# Patient Record
Sex: MALE | Race: OTHER | HISPANIC OR LATINO | URBAN - METROPOLITAN AREA
[De-identification: names, ages, dates, MRNs, and addresses within clinical notes are randomized per-mention and may not be internally consistent; named-entity substitution may affect disease eponyms.]

---

## 2017-02-05 ENCOUNTER — EMERGENCY (EMERGENCY)
Facility: HOSPITAL | Age: 22
LOS: 0 days | Discharge: HOME | End: 2017-02-05
Attending: EMERGENCY MEDICINE | Admitting: EMERGENCY MEDICINE
Payer: COMMERCIAL

## 2017-02-05 VITALS
TEMPERATURE: 98 F | SYSTOLIC BLOOD PRESSURE: 108 MMHG | RESPIRATION RATE: 18 BRPM | HEART RATE: 77 BPM | OXYGEN SATURATION: 99 % | DIASTOLIC BLOOD PRESSURE: 73 MMHG

## 2017-02-05 VITALS
TEMPERATURE: 98 F | DIASTOLIC BLOOD PRESSURE: 64 MMHG | HEIGHT: 66 IN | HEART RATE: 71 BPM | SYSTOLIC BLOOD PRESSURE: 115 MMHG | RESPIRATION RATE: 18 BRPM | WEIGHT: 147.05 LBS | OXYGEN SATURATION: 99 %

## 2017-02-05 DIAGNOSIS — S00.11XA CONTUSION OF RIGHT EYELID AND PERIOCULAR AREA, INITIAL ENCOUNTER: ICD-10-CM
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
## 2017-02-05 DIAGNOSIS — M54.2 CERVICALGIA: ICD-10-CM
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
## 2017-02-05 DIAGNOSIS — S09.90XA UNSPECIFIED INJURY OF HEAD, INITIAL ENCOUNTER: ICD-10-CM

## 2017-02-05 DIAGNOSIS — S00.12XA CONTUSION OF LEFT EYELID AND PERIOCULAR AREA, INITIAL ENCOUNTER: ICD-10-CM

## 2017-02-05 DIAGNOSIS — R07.89 OTHER CHEST PAIN: ICD-10-CM

## 2017-02-05 DIAGNOSIS — S00.83XA CONTUSION OF OTHER PART OF HEAD, INITIAL ENCOUNTER: ICD-10-CM

## 2017-02-05 PROCEDURE — 99284 EMERGENCY DEPT VISIT MOD MDM: CPT

## 2017-02-05 PROCEDURE — 72125 CT NECK SPINE W/O DYE: CPT | Mod: 26

## 2017-02-05 PROCEDURE — 71250 CT THORAX DX C-: CPT | Mod: 26

## 2017-02-05 PROCEDURE — 70450 CT HEAD/BRAIN W/O DYE: CPT | Mod: 26

## 2017-02-05 NOTE — ED PROVIDER NOTE - NEUROLOGICAL, MLM
Alert and oriented, no focal deficits, no motor or sensory deficits. Cranial nerves II-XII intact. 5/5 strength in upper and lower extremities flexion and extension.

## 2017-02-05 NOTE — ED PROVIDER NOTE - CARE PLAN
Principal Discharge DX:	Closed head injury, initial encounter  Secondary Diagnosis:	Contusion of scalp, initial encounter  Secondary Diagnosis:	Muscle strain

## 2017-02-05 NOTE — ED PROVIDER NOTE - MUSCULOSKELETAL, MLM
+TTP right superior orbital brim. TTP of the soft tissue overlying the right parietal cranium. TTP of bruise that pt has on left axilla.

## 2017-02-05 NOTE — ED PROVIDER NOTE - NS ED MD SCRIBE ATTENDING SCRIBE SECTIONS
Name band;
VITAL SIGNS( Pullset)/PAST MEDICAL/SURGICAL/SOCIAL HISTORY/PHYSICAL EXAM/DISPOSITION/HISTORY OF PRESENT ILLNESS/REVIEW OF SYSTEMS

## 2017-02-05 NOTE — ED PROVIDER NOTE - MEDICAL DECISION MAKING DETAILS
CTs of head, neck, face/orbits, and chest do not show any evidence of acute pathology or fx. Patient informed of the results and the imaging and is eager to leave.
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
## 2019-09-23 ENCOUNTER — EMERGENCY (EMERGENCY)
Facility: HOSPITAL | Age: 24
LOS: 0 days | Discharge: HOME | End: 2019-09-23
Attending: EMERGENCY MEDICINE
Payer: COMMERCIAL

## 2019-09-23 VITALS
SYSTOLIC BLOOD PRESSURE: 125 MMHG | RESPIRATION RATE: 16 BRPM | DIASTOLIC BLOOD PRESSURE: 76 MMHG | HEART RATE: 105 BPM | OXYGEN SATURATION: 90 % | WEIGHT: 143.08 LBS | TEMPERATURE: 98 F | HEIGHT: 65 IN

## 2019-09-23 VITALS — OXYGEN SATURATION: 99 % | HEART RATE: 97 BPM | RESPIRATION RATE: 16 BRPM

## 2019-09-23 DIAGNOSIS — F17.210 NICOTINE DEPENDENCE, CIGARETTES, UNCOMPLICATED: ICD-10-CM

## 2019-09-23 DIAGNOSIS — R07.9 CHEST PAIN, UNSPECIFIED: ICD-10-CM

## 2019-09-23 DIAGNOSIS — R07.1 CHEST PAIN ON BREATHING: ICD-10-CM

## 2019-09-23 PROCEDURE — 71046 X-RAY EXAM CHEST 2 VIEWS: CPT

## 2019-09-23 PROCEDURE — 93005 ELECTROCARDIOGRAM TRACING: CPT

## 2019-09-23 PROCEDURE — 71046 X-RAY EXAM CHEST 2 VIEWS: CPT | Mod: 26

## 2019-09-23 PROCEDURE — 93010 ELECTROCARDIOGRAM REPORT: CPT

## 2019-09-23 PROCEDURE — 99283 EMERGENCY DEPT VISIT LOW MDM: CPT

## 2019-09-23 PROCEDURE — 99283 EMERGENCY DEPT VISIT LOW MDM: CPT | Mod: 25

## 2019-09-23 RX ORDER — IBUPROFEN 200 MG
600 TABLET ORAL ONCE
Refills: 0 | Status: COMPLETED | OUTPATIENT
Start: 2019-09-23 | End: 2019-09-23

## 2019-09-23 RX ADMIN — Medication 600 MILLIGRAM(S): at 16:04

## 2019-09-23 NOTE — ED STATDOCS - PROGRESS NOTE DETAILS
23 y/o male with no pertinent PMHx presents to the ED c/o sharp chest pain last night. States CP worsens with taking a deep breath. No hx of chest pain. No chest pain today. Was seen in  PTA, and EKG was normal but was sent to ED. No family hx of cardiac disease or DM. NKDA. Current smoker, 1 pack a week.  Kylah Valadez PA-C EKG unremarkable.  CXR Neg.  Probable pleurisy.  will DC with Cristal DELGADILLO.  Kylah Valadez PA-C Pulse checked by me 99 % on RA   not tachycardic   Dr. Multani instructed NA to correct

## 2019-09-23 NOTE — ED ADULT TRIAGE NOTE - CHIEF COMPLAINT QUOTE
pt ambulatory to ED c/o chest pain/tightness w/ SOB since 4am today w/ cough x2days. reports partial relief throughout the day today. denies n/v, heavy lifting, and trauma.

## 2019-09-23 NOTE — ED STATDOCS - CLINICAL SUMMARY MEDICAL DECISION MAKING FREE TEXT BOX
most likely costochondritis or pleurisy, will give anti inflammatory, EKG, CXR. most likely costochondritis or pleurisy, will give anti inflammatory, EKG, CXR.        EKG unremarkable.  CXR Neg.  Probable pleurisy.  will DC with Cristal DELGADILLO.  Kylah Valadez PA-C

## 2019-09-23 NOTE — ED STATDOCS - OBJECTIVE STATEMENT
23 y/o male with no pertinent PMHx presents to the ED c/o sharp chest pain last night. States CP worsens with taking a deep breath. No hx of chest pain. No chest pain today. Was seen in  PTA, and EKG was normal but was sent to ED. No family hx of cardiac disease or DM. NKDA. Current smoker, 1 pack a week.

## 2019-09-23 NOTE — ED STATDOCS - PATIENT PORTAL LINK FT
You can access the FollowMyHealth Patient Portal offered by [facility] by registering at the following website: [email or web address]. By joining [facility]’s FollowMyHealth portal, you will also be able to view your health information using other applications (apps) compatible with our system.

## 2023-03-02 ENCOUNTER — EMERGENCY (EMERGENCY)
Facility: HOSPITAL | Age: 28
LOS: 0 days | Discharge: HOME | End: 2023-03-02
Attending: EMERGENCY MEDICINE
Payer: MEDICAID

## 2023-03-02 VITALS
HEART RATE: 110 BPM | TEMPERATURE: 99 F | DIASTOLIC BLOOD PRESSURE: 77 MMHG | RESPIRATION RATE: 19 BRPM | OXYGEN SATURATION: 99 % | SYSTOLIC BLOOD PRESSURE: 103 MMHG

## 2023-03-02 VITALS — WEIGHT: 145.95 LBS | HEIGHT: 66 IN

## 2023-03-02 DIAGNOSIS — R59.0 LOCALIZED ENLARGED LYMPH NODES: ICD-10-CM

## 2023-03-02 DIAGNOSIS — R05.9 COUGH, UNSPECIFIED: ICD-10-CM

## 2023-03-02 DIAGNOSIS — J02.0 STREPTOCOCCAL PHARYNGITIS: ICD-10-CM

## 2023-03-02 DIAGNOSIS — F17.290 NICOTINE DEPENDENCE, OTHER TOBACCO PRODUCT, UNCOMPLICATED: ICD-10-CM

## 2023-03-02 DIAGNOSIS — J02.9 ACUTE PHARYNGITIS, UNSPECIFIED: ICD-10-CM

## 2023-03-02 DIAGNOSIS — Z86.16 PERSONAL HISTORY OF COVID-19: ICD-10-CM

## 2023-03-02 LAB — S PYO AG SPEC QL IA: POSITIVE

## 2023-03-02 PROCEDURE — 99283 EMERGENCY DEPT VISIT LOW MDM: CPT

## 2023-03-02 PROCEDURE — 96372 THER/PROPH/DIAG INJ SC/IM: CPT

## 2023-03-02 PROCEDURE — 87880 STREP A ASSAY W/OPTIC: CPT

## 2023-03-02 PROCEDURE — 99284 EMERGENCY DEPT VISIT MOD MDM: CPT

## 2023-03-02 RX ORDER — IBUPROFEN 200 MG
600 TABLET ORAL ONCE
Refills: 0 | Status: COMPLETED | OUTPATIENT
Start: 2023-03-02 | End: 2023-03-02

## 2023-03-02 RX ORDER — DEXAMETHASONE 0.5 MG/5ML
10 ELIXIR ORAL ONCE
Refills: 0 | Status: COMPLETED | OUTPATIENT
Start: 2023-03-02 | End: 2023-03-02

## 2023-03-02 RX ORDER — PENICILLIN G BENZATHINE 1200000 [IU]/2ML
1.2 INJECTION, SUSPENSION INTRAMUSCULAR ONCE
Refills: 0 | Status: COMPLETED | OUTPATIENT
Start: 2023-03-02 | End: 2023-03-02

## 2023-03-02 RX ADMIN — Medication 10 MILLIGRAM(S): at 21:02

## 2023-03-02 RX ADMIN — Medication 600 MILLIGRAM(S): at 21:03

## 2023-03-02 RX ADMIN — PENICILLIN G BENZATHINE 1.2 MILLION UNIT(S): 1200000 INJECTION, SUSPENSION INTRAMUSCULAR at 21:58

## 2023-03-02 NOTE — ED STATDOCS - NSFOLLOWUPINSTRUCTIONS_ED_ALL_ED_FT
Strep Throat    WHAT YOU NEED TO KNOW:    Strep throat is a throat infection caused by bacteria. It is easily spread from person to person.          DISCHARGE INSTRUCTIONS:    Call 911 for any of the following:     You have trouble breathing.        Return to the emergency department if:     You have new symptoms like a bad headache, stiff neck, chest pain, or vomiting.      You are drooling because you cannot swallow your spit.    Contact your healthcare provider if:     You have a fever.      You have a rash or ear pain.      You have green, yellow-brown, or bloody mucus when you cough or blow your nose.      You are unable to drink anything.      You have questions or concerns about your condition or care.    Medicines:     Take your medicine as directed. Contact your healthcare provider if you think your medicine is not helping or if you have side effects. Tell him or her if you are allergic to any medicine. Keep a list of the medicines, vitamins, and herbs you take. Include the amounts, and when and why you take them. Bring the list or the pill bottles to follow-up visits. Carry your medicine list with you in case of an emergency.    Manage your symptoms:     Use lozenges, ice, soft foods, or popsicles to soothe your throat.      Drink juice, milk shakes, or soup if your throat is too sore to eat solid food. Drinking liquids can also help prevent dehydration.      Gargle with salt water. Mix ¼ teaspoon salt in a glass of warm water and gargle. This may help reduce swelling in your throat.      Do not smoke. Nicotine and other chemicals in cigarettes and cigars can cause lung damage and make your symptoms worse. Ask your healthcare provider for information if you currently smoke and need help to quit. E-cigarettes or smokeless tobacco still contain nicotine. Talk to your healthcare provider before you use these products.     Return to work or school 24 hours after you start antibiotic medicine.    Prevent the spread of strep throat:     Wash your hands often. Use soap and water. Wash your hands after you use the bathroom, change a child's diapers, or sneeze. Wash your hands before you prepare or eat food.       Do not share food or drinks. Replace your toothbrush after you have taken antibiotics for 24 hours.    Follow up with your healthcare provider as directed: Write down your questions so you remember to ask them during your visits.

## 2023-03-02 NOTE — ED STATDOCS - OBJECTIVE STATEMENT
Pt. is a 27 yo M with a hx of Covid 1 year ago presenting with sore throat X 3 days.  Patient states it hurts to swallow liquids and solids.  He drank tea and took mucinex without relief. Mild cough.  States he has pain in right side of neck more than left and it hurts to move head to the side at times.  Patient denies fever.  Denies sick contacts, vomiting or diarrhea. +vape use.  Took Covid test this morning which was negative.

## 2023-03-02 NOTE — ED STATDOCS - CLINICAL SUMMARY MEDICAL DECISION MAKING FREE TEXT BOX
27 yo M with acute pharyngitis and adenitis.  Will get rapid strep testing and send throat culture to r/o strep pharyngitis.  Pain control, antibiotics, and outpatient PMD follow up planned.

## 2023-03-02 NOTE — ED STATDOCS - NSDCPRINTRESULTS_ED_ALL_ED
Medical Week 4 Survey      Responses   [facility] patient discharged from?  Too   COVID-19 Test Status  Negative   Does the patient have one of the following disease processes/diagnoses(primary or secondary)?  Other   Week 4 attempt successful?  Yes   Call start time  1156   Revoke  Decline to participate   Call end time  1156          Cony Cat RN  
Patient requests all Lab, Cardiology, and Radiology Results on their Discharge Instructions

## 2023-03-02 NOTE — ED STATDOCS - ENMT, MLM
Nasal mucosa clear.  Mouth with normal mucosa  +erythematous soft palate and posterior pharynx;  midline uvula, no exudate; +bilateral cervical adenopathy

## 2023-03-02 NOTE — ED STATDOCS - PATIENT PORTAL LINK FT
You can access the FollowMyHealth Patient Portal offered by [facility] by registering at the following website: [email or web address]. By joining [facility]’s FollowMyHealth portal, you will also be able to view your health information using other applications (apps) compatible with our system.

## 2023-03-02 NOTE — ED STATDOCS - PROGRESS NOTE DETAILS
27 yo male with no significant PMH, had covid last year presents with sore throat x3 days. Pt states it hurts every time he eats or swallows or when he moves his neck. Had sore throat when he had covid but now it feels worse. Denies sick contacts, fever.   PE: pharynx erythematous with cervical adenopathy. No exudates. Will swab throat to r/o bacterial pharyngitis. -Miguel Du PA-C +strep. Offered oral abx vs IM abx. Pt wants the IM abx. Will d/c after. -Miguel Du PA-C

## 2023-03-02 NOTE — ED STATDOCS - ATTENDING APP SHARED VISIT CONTRIBUTION OF CARE
Attending Contribution to Care: I, Radha Mariano, performed the initial face to face bedside interview with this patient regarding history of present illness, review of symptoms and relevant past medical, social and family history.  I completed an independent physical examination.  I was the initial provider who evaluated this patient. I have signed out the follow up of any pending tests (i.e. labs, radiological studies) to the ACP.  I have communicated the patient’s plan of care and disposition with the ACP.

## 2025-05-07 NOTE — ED PROVIDER NOTE - CONSTITUTIONAL, MLM
normal... Well appearing, well nourished, awake, alert, oriented to person, place, time/situation and in no apparent distress. No restrictions Activity as tolerated

## 2025-07-08 NOTE — ED ADULT NURSE NOTE - CHIEF COMPLAINT QUOTE
pt ambulatory to ED c/o chest pain/tightness w/ SOB since 4am today w/ cough x2days. reports partial relief throughout the day today. denies n/v, heavy lifting, and trauma.
Opt out